# Patient Record
Sex: FEMALE | Race: WHITE | NOT HISPANIC OR LATINO | Employment: UNEMPLOYED | URBAN - METROPOLITAN AREA
[De-identification: names, ages, dates, MRNs, and addresses within clinical notes are randomized per-mention and may not be internally consistent; named-entity substitution may affect disease eponyms.]

---

## 2019-12-21 ENCOUNTER — HOSPITAL ENCOUNTER (EMERGENCY)
Facility: HOSPITAL | Age: 49
Discharge: PSYCHIATRIC OR SUBSTANCE USE FACILITY | End: 2019-12-22
Attending: EMERGENCY MEDICINE | Admitting: EMERGENCY MEDICINE

## 2019-12-21 DIAGNOSIS — F10.920 ALCOHOLIC INTOXICATION WITHOUT COMPLICATION (HCC): Primary | ICD-10-CM

## 2019-12-21 DIAGNOSIS — T50.902A PURPOSEFUL NON-SUICIDAL DRUG INGESTION, INITIAL ENCOUNTER (HCC): ICD-10-CM

## 2019-12-21 LAB
ALBUMIN SERPL-MCNC: 4.4 G/DL (ref 3.5–5.2)
ALBUMIN/GLOB SERPL: 1.5 G/DL
ALP SERPL-CCNC: 108 U/L (ref 39–117)
ALT SERPL W P-5'-P-CCNC: 17 U/L (ref 1–33)
AMPHET+METHAMPHET UR QL: NEGATIVE
ANION GAP SERPL CALCULATED.3IONS-SCNC: 19.8 MMOL/L (ref 5–15)
APAP SERPL-MCNC: <5 MCG/ML (ref 10–30)
AST SERPL-CCNC: 19 U/L (ref 1–32)
BARBITURATES UR QL SCN: NEGATIVE
BASOPHILS # BLD AUTO: 0.06 10*3/MM3 (ref 0–0.2)
BASOPHILS NFR BLD AUTO: 0.7 % (ref 0–1.5)
BENZODIAZ UR QL SCN: NEGATIVE
BILIRUB SERPL-MCNC: <0.2 MG/DL (ref 0.2–1.2)
BUN BLD-MCNC: 12 MG/DL (ref 6–20)
BUN/CREAT SERPL: 18.8 (ref 7–25)
CALCIUM SPEC-SCNC: 8.7 MG/DL (ref 8.6–10.5)
CANNABINOIDS SERPL QL: NEGATIVE
CHLORIDE SERPL-SCNC: 107 MMOL/L (ref 98–107)
CO2 SERPL-SCNC: 20.2 MMOL/L (ref 22–29)
COCAINE UR QL: NEGATIVE
CREAT BLD-MCNC: 0.64 MG/DL (ref 0.57–1)
DEPRECATED RDW RBC AUTO: 43.8 FL (ref 37–54)
EOSINOPHIL # BLD AUTO: 0.16 10*3/MM3 (ref 0–0.4)
EOSINOPHIL NFR BLD AUTO: 1.8 % (ref 0.3–6.2)
ERYTHROCYTE [DISTWIDTH] IN BLOOD BY AUTOMATED COUNT: 13 % (ref 12.3–15.4)
ETHANOL BLD-MCNC: 297 MG/DL (ref 0–10)
ETHANOL BLD-MCNC: 57 MG/DL (ref 0–10)
ETHANOL UR QL: 0.06 %
ETHANOL UR QL: 0.3 %
GFR SERPL CREATININE-BSD FRML MDRD: 119 ML/MIN/1.73
GFR SERPL CREATININE-BSD FRML MDRD: 98 ML/MIN/1.73
GLOBULIN UR ELPH-MCNC: 3 GM/DL
GLUCOSE BLD-MCNC: 111 MG/DL (ref 65–99)
HCT VFR BLD AUTO: 42.5 % (ref 34–46.6)
HGB BLD-MCNC: 14.3 G/DL (ref 12–15.9)
HOLD SPECIMEN: NORMAL
HOLD SPECIMEN: NORMAL
IMM GRANULOCYTES # BLD AUTO: 0.03 10*3/MM3 (ref 0–0.05)
IMM GRANULOCYTES NFR BLD AUTO: 0.3 % (ref 0–0.5)
LYMPHOCYTES # BLD AUTO: 4.76 10*3/MM3 (ref 0.7–3.1)
LYMPHOCYTES NFR BLD AUTO: 54.2 % (ref 19.6–45.3)
MAGNESIUM SERPL-MCNC: 2 MG/DL (ref 1.6–2.6)
MCH RBC QN AUTO: 31 PG (ref 26.6–33)
MCHC RBC AUTO-ENTMCNC: 33.6 G/DL (ref 31.5–35.7)
MCV RBC AUTO: 92 FL (ref 79–97)
METHADONE UR QL SCN: NEGATIVE
MONOCYTES # BLD AUTO: 0.51 10*3/MM3 (ref 0.1–0.9)
MONOCYTES NFR BLD AUTO: 5.8 % (ref 5–12)
NEUTROPHILS # BLD AUTO: 3.27 10*3/MM3 (ref 1.7–7)
NEUTROPHILS NFR BLD AUTO: 37.2 % (ref 42.7–76)
NRBC BLD AUTO-RTO: 0 /100 WBC (ref 0–0.2)
OPIATES UR QL: NEGATIVE
OXYCODONE UR QL SCN: NEGATIVE
PLATELET # BLD AUTO: 204 10*3/MM3 (ref 140–450)
PMV BLD AUTO: 9.4 FL (ref 6–12)
POTASSIUM BLD-SCNC: 3.3 MMOL/L (ref 3.5–5.2)
PROT SERPL-MCNC: 7.4 G/DL (ref 6–8.5)
RBC # BLD AUTO: 4.62 10*6/MM3 (ref 3.77–5.28)
SALICYLATES SERPL-MCNC: <0.3 MG/DL
SODIUM BLD-SCNC: 147 MMOL/L (ref 136–145)
WBC NRBC COR # BLD: 8.79 10*3/MM3 (ref 3.4–10.8)
WHOLE BLOOD HOLD SPECIMEN: NORMAL

## 2019-12-21 PROCEDURE — 80307 DRUG TEST PRSMV CHEM ANLYZR: CPT | Performed by: EMERGENCY MEDICINE

## 2019-12-21 PROCEDURE — 80053 COMPREHEN METABOLIC PANEL: CPT | Performed by: EMERGENCY MEDICINE

## 2019-12-21 PROCEDURE — 85025 COMPLETE CBC W/AUTO DIFF WBC: CPT | Performed by: EMERGENCY MEDICINE

## 2019-12-21 PROCEDURE — 99285 EMERGENCY DEPT VISIT HI MDM: CPT

## 2019-12-21 PROCEDURE — 83735 ASSAY OF MAGNESIUM: CPT | Performed by: EMERGENCY MEDICINE

## 2019-12-21 PROCEDURE — 96365 THER/PROPH/DIAG IV INF INIT: CPT

## 2019-12-21 PROCEDURE — 25010000002 MAGNESIUM SULFATE PER 500 MG OF MAGNESIUM: Performed by: EMERGENCY MEDICINE

## 2019-12-21 PROCEDURE — 25010000002 THIAMINE PER 100 MG: Performed by: EMERGENCY MEDICINE

## 2019-12-21 RX ORDER — POTASSIUM CHLORIDE 750 MG/1
40 CAPSULE, EXTENDED RELEASE ORAL ONCE
Status: COMPLETED | OUTPATIENT
Start: 2019-12-21 | End: 2019-12-21

## 2019-12-21 RX ORDER — POTASSIUM CHLORIDE 7.45 MG/ML
10 INJECTION INTRAVENOUS ONCE
Status: DISCONTINUED | OUTPATIENT
Start: 2019-12-21 | End: 2019-12-21

## 2019-12-21 RX ORDER — ESCITALOPRAM OXALATE 20 MG/1
20 TABLET ORAL DAILY
COMMUNITY
Start: 2019-09-09 | End: 2020-09-08

## 2019-12-21 RX ORDER — BUPROPION HYDROCHLORIDE 100 MG/1
300 TABLET ORAL 2 TIMES DAILY
COMMUNITY
End: 2019-12-24 | Stop reason: HOSPADM

## 2019-12-21 RX ORDER — SODIUM CHLORIDE 0.9 % (FLUSH) 0.9 %
10 SYRINGE (ML) INJECTION AS NEEDED
Status: DISCONTINUED | OUTPATIENT
Start: 2019-12-21 | End: 2019-12-22 | Stop reason: HOSPADM

## 2019-12-21 RX ORDER — MIRTAZAPINE 15 MG/1
15 TABLET, FILM COATED ORAL NIGHTLY
COMMUNITY

## 2019-12-21 RX ORDER — BUSPIRONE HYDROCHLORIDE 15 MG/1
15 TABLET ORAL 3 TIMES DAILY
COMMUNITY

## 2019-12-21 RX ADMIN — THIAMINE HYDROCHLORIDE 1000 ML/HR: 100 INJECTION, SOLUTION INTRAMUSCULAR; INTRAVENOUS at 15:35

## 2019-12-21 RX ADMIN — POTASSIUM CHLORIDE 40 MEQ: 750 CAPSULE, EXTENDED RELEASE ORAL at 18:05

## 2019-12-22 ENCOUNTER — HOSPITAL ENCOUNTER (INPATIENT)
Facility: HOSPITAL | Age: 49
LOS: 2 days | Discharge: HOME | End: 2019-12-24
Attending: SPECIALIST | Admitting: SPECIALIST

## 2019-12-22 VITALS
HEIGHT: 66 IN | HEART RATE: 71 BPM | DIASTOLIC BLOOD PRESSURE: 83 MMHG | BODY MASS INDEX: 26.52 KG/M2 | WEIGHT: 165 LBS | SYSTOLIC BLOOD PRESSURE: 130 MMHG | OXYGEN SATURATION: 96 % | RESPIRATION RATE: 18 BRPM | TEMPERATURE: 97.7 F

## 2019-12-22 PROBLEM — F32.9 MAJOR DEPRESSIVE DISORDER WITH SINGLE EPISODE: Status: ACTIVE | Noted: 2019-12-22

## 2019-12-22 PROBLEM — IMO0001 ALCOHOLISM /ALCOHOL ABUSE: Status: ACTIVE | Noted: 2019-12-22

## 2019-12-22 PROBLEM — B00.2 PRIMARY HERPES SIMPLEX INFECTION OF ORAL REGION: Status: ACTIVE | Noted: 2019-12-22

## 2019-12-22 PROBLEM — Z72.0 TOBACCO ABUSE: Status: ACTIVE | Noted: 2019-12-22

## 2019-12-22 LAB
ALBUMIN SERPL-MCNC: 4.1 G/DL (ref 3.5–5.2)
ALBUMIN/GLOB SERPL: 1.5 G/DL
ALP SERPL-CCNC: 106 U/L (ref 39–117)
ALT SERPL W P-5'-P-CCNC: 14 U/L (ref 1–33)
ANION GAP SERPL CALCULATED.3IONS-SCNC: 14.1 MMOL/L (ref 5–15)
AST SERPL-CCNC: 17 U/L (ref 1–32)
BACTERIA UR QL AUTO: ABNORMAL /HPF
BASOPHILS # BLD AUTO: 0.03 10*3/MM3 (ref 0–0.2)
BASOPHILS NFR BLD AUTO: 0.5 % (ref 0–1.5)
BILIRUB SERPL-MCNC: 0.3 MG/DL (ref 0.2–1.2)
BILIRUB UR QL STRIP: NEGATIVE
BUN BLD-MCNC: 11 MG/DL (ref 6–20)
BUN/CREAT SERPL: 16.4 (ref 7–25)
CALCIUM SPEC-SCNC: 9 MG/DL (ref 8.6–10.5)
CHLORIDE SERPL-SCNC: 106 MMOL/L (ref 98–107)
CLARITY UR: ABNORMAL
CO2 SERPL-SCNC: 24.9 MMOL/L (ref 22–29)
COLOR UR: YELLOW
CREAT BLD-MCNC: 0.67 MG/DL (ref 0.57–1)
DEPRECATED RDW RBC AUTO: 42.4 FL (ref 37–54)
EOSINOPHIL # BLD AUTO: 0.11 10*3/MM3 (ref 0–0.4)
EOSINOPHIL NFR BLD AUTO: 1.7 % (ref 0.3–6.2)
ERYTHROCYTE [DISTWIDTH] IN BLOOD BY AUTOMATED COUNT: 13 % (ref 12.3–15.4)
GFR SERPL CREATININE-BSD FRML MDRD: 93 ML/MIN/1.73
GLOBULIN UR ELPH-MCNC: 2.8 GM/DL
GLUCOSE BLD-MCNC: 100 MG/DL (ref 65–99)
GLUCOSE UR STRIP-MCNC: NEGATIVE MG/DL
HCT VFR BLD AUTO: 39.4 % (ref 34–46.6)
HGB BLD-MCNC: 13.3 G/DL (ref 12–15.9)
HGB UR QL STRIP.AUTO: NEGATIVE
HYALINE CASTS UR QL AUTO: ABNORMAL /LPF
IMM GRANULOCYTES # BLD AUTO: 0.02 10*3/MM3 (ref 0–0.05)
IMM GRANULOCYTES NFR BLD AUTO: 0.3 % (ref 0–0.5)
KETONES UR QL STRIP: NEGATIVE
LEUKOCYTE ESTERASE UR QL STRIP.AUTO: ABNORMAL
LYMPHOCYTES # BLD AUTO: 2.29 10*3/MM3 (ref 0.7–3.1)
LYMPHOCYTES NFR BLD AUTO: 34.9 % (ref 19.6–45.3)
MCH RBC QN AUTO: 30.7 PG (ref 26.6–33)
MCHC RBC AUTO-ENTMCNC: 33.8 G/DL (ref 31.5–35.7)
MCV RBC AUTO: 91 FL (ref 79–97)
MONOCYTES # BLD AUTO: 0.5 10*3/MM3 (ref 0.1–0.9)
MONOCYTES NFR BLD AUTO: 7.6 % (ref 5–12)
NEUTROPHILS # BLD AUTO: 3.62 10*3/MM3 (ref 1.7–7)
NEUTROPHILS NFR BLD AUTO: 55 % (ref 42.7–76)
NITRITE UR QL STRIP: POSITIVE
NRBC BLD AUTO-RTO: 0 /100 WBC (ref 0–0.2)
PH UR STRIP.AUTO: 5.5 [PH] (ref 5–8)
PLATELET # BLD AUTO: 199 10*3/MM3 (ref 140–450)
PMV BLD AUTO: 9.3 FL (ref 6–12)
POTASSIUM BLD-SCNC: 4.6 MMOL/L (ref 3.5–5.2)
PROT SERPL-MCNC: 6.9 G/DL (ref 6–8.5)
PROT UR QL STRIP: NEGATIVE
RBC # BLD AUTO: 4.33 10*6/MM3 (ref 3.77–5.28)
RBC # UR: ABNORMAL /HPF
REF LAB TEST METHOD: ABNORMAL
SODIUM BLD-SCNC: 145 MMOL/L (ref 136–145)
SP GR UR STRIP: 1.02 (ref 1–1.03)
SQUAMOUS #/AREA URNS HPF: ABNORMAL /HPF
T4 FREE SERPL-MCNC: 0.93 NG/DL (ref 0.93–1.7)
TSH SERPL DL<=0.05 MIU/L-ACNC: 0.6 UIU/ML (ref 0.27–4.2)
UROBILINOGEN UR QL STRIP: ABNORMAL
WBC NRBC COR # BLD: 6.57 10*3/MM3 (ref 3.4–10.8)
WBC UR QL AUTO: ABNORMAL /HPF

## 2019-12-22 PROCEDURE — 84439 ASSAY OF FREE THYROXINE: CPT | Performed by: SPECIALIST

## 2019-12-22 PROCEDURE — 84443 ASSAY THYROID STIM HORMONE: CPT | Performed by: SPECIALIST

## 2019-12-22 PROCEDURE — 85025 COMPLETE CBC W/AUTO DIFF WBC: CPT | Performed by: SPECIALIST

## 2019-12-22 PROCEDURE — 81001 URINALYSIS AUTO W/SCOPE: CPT | Performed by: SPECIALIST

## 2019-12-22 PROCEDURE — 90791 PSYCH DIAGNOSTIC EVALUATION: CPT

## 2019-12-22 PROCEDURE — 80053 COMPREHEN METABOLIC PANEL: CPT | Performed by: SPECIALIST

## 2019-12-22 RX ORDER — ACETAMINOPHEN 325 MG/1
650 TABLET ORAL EVERY 4 HOURS PRN
Status: DISCONTINUED | OUTPATIENT
Start: 2019-12-22 | End: 2019-12-24 | Stop reason: HOSPADM

## 2019-12-22 RX ORDER — BUPROPION HYDROCHLORIDE 300 MG/1
300 TABLET ORAL DAILY
Status: DISCONTINUED | OUTPATIENT
Start: 2019-12-22 | End: 2019-12-24 | Stop reason: HOSPADM

## 2019-12-22 RX ORDER — LOPERAMIDE HYDROCHLORIDE 2 MG/1
2 CAPSULE ORAL
Status: DISCONTINUED | OUTPATIENT
Start: 2019-12-22 | End: 2019-12-24 | Stop reason: HOSPADM

## 2019-12-22 RX ORDER — ESCITALOPRAM OXALATE 20 MG/1
20 TABLET ORAL DAILY
Status: DISCONTINUED | OUTPATIENT
Start: 2019-12-22 | End: 2019-12-24 | Stop reason: HOSPADM

## 2019-12-22 RX ORDER — DOCOSANOL 100 MG/G
CREAM TOPICAL
Status: DISCONTINUED | OUTPATIENT
Start: 2019-12-22 | End: 2019-12-24 | Stop reason: HOSPADM

## 2019-12-22 RX ORDER — BUSPIRONE HYDROCHLORIDE 10 MG/1
10 TABLET ORAL EVERY 8 HOURS SCHEDULED
Status: DISCONTINUED | OUTPATIENT
Start: 2019-12-22 | End: 2019-12-22

## 2019-12-22 RX ORDER — ALUMINA, MAGNESIA, AND SIMETHICONE 2400; 2400; 240 MG/30ML; MG/30ML; MG/30ML
15 SUSPENSION ORAL EVERY 6 HOURS PRN
Status: DISCONTINUED | OUTPATIENT
Start: 2019-12-22 | End: 2019-12-24 | Stop reason: HOSPADM

## 2019-12-22 RX ORDER — CHOLECALCIFEROL (VITAMIN D3) 125 MCG
5 CAPSULE ORAL NIGHTLY PRN
Status: DISCONTINUED | OUTPATIENT
Start: 2019-12-22 | End: 2019-12-24 | Stop reason: HOSPADM

## 2019-12-22 RX ORDER — BUSPIRONE HYDROCHLORIDE 10 MG/1
10 TABLET ORAL EVERY 8 HOURS
Status: DISCONTINUED | OUTPATIENT
Start: 2019-12-23 | End: 2019-12-24 | Stop reason: HOSPADM

## 2019-12-22 RX ORDER — GUANFACINE 1 MG/1
1 TABLET, EXTENDED RELEASE ORAL DAILY
Status: DISCONTINUED | OUTPATIENT
Start: 2019-12-22 | End: 2019-12-24 | Stop reason: HOSPADM

## 2019-12-22 RX ORDER — MIRTAZAPINE 15 MG/1
15 TABLET, FILM COATED ORAL NIGHTLY
Status: DISCONTINUED | OUTPATIENT
Start: 2019-12-22 | End: 2019-12-24 | Stop reason: HOSPADM

## 2019-12-22 RX ADMIN — GUANFACINE 1 MG: 1 TABLET, EXTENDED RELEASE ORAL at 14:50

## 2019-12-22 RX ADMIN — ACETAMINOPHEN 650 MG: 325 TABLET, FILM COATED ORAL at 09:11

## 2019-12-22 RX ADMIN — BUPROPION HYDROCHLORIDE 300 MG: 300 TABLET, EXTENDED RELEASE ORAL at 09:14

## 2019-12-22 RX ADMIN — BUSPIRONE HYDROCHLORIDE 10 MG: 10 TABLET ORAL at 09:14

## 2019-12-22 RX ADMIN — BUSPIRONE HYDROCHLORIDE 10 MG: 10 TABLET ORAL at 14:12

## 2019-12-22 RX ADMIN — DOCOSANOL: 100 CREAM TOPICAL at 20:09

## 2019-12-22 RX ADMIN — MIRTAZAPINE 15 MG: 15 TABLET, FILM COATED ORAL at 20:03

## 2019-12-22 RX ADMIN — BUSPIRONE HYDROCHLORIDE 10 MG: 10 TABLET ORAL at 20:08

## 2019-12-22 RX ADMIN — ESCITALOPRAM 20 MG: 20 TABLET, FILM COATED ORAL at 09:13

## 2019-12-22 RX ADMIN — Medication 5 MG: at 20:06

## 2019-12-23 RX ADMIN — Medication 5 MG: at 21:10

## 2019-12-23 RX ADMIN — DOCOSANOL 1 APPLICATION: 100 CREAM TOPICAL at 18:20

## 2019-12-23 RX ADMIN — BUPROPION HYDROCHLORIDE 300 MG: 300 TABLET, EXTENDED RELEASE ORAL at 08:41

## 2019-12-23 RX ADMIN — MIRTAZAPINE 15 MG: 15 TABLET, FILM COATED ORAL at 21:10

## 2019-12-23 RX ADMIN — DOCOSANOL: 100 CREAM TOPICAL at 21:14

## 2019-12-23 RX ADMIN — ESCITALOPRAM 20 MG: 20 TABLET, FILM COATED ORAL at 08:41

## 2019-12-23 RX ADMIN — DOCOSANOL: 100 CREAM TOPICAL at 12:14

## 2019-12-23 RX ADMIN — DOCOSANOL 1 APPLICATION: 100 CREAM TOPICAL at 08:44

## 2019-12-23 RX ADMIN — ACETAMINOPHEN 650 MG: 325 TABLET, FILM COATED ORAL at 12:12

## 2019-12-23 RX ADMIN — GUANFACINE 1 MG: 1 TABLET, EXTENDED RELEASE ORAL at 08:42

## 2019-12-23 RX ADMIN — BUSPIRONE HYDROCHLORIDE 10 MG: 10 TABLET ORAL at 23:03

## 2019-12-23 RX ADMIN — BUSPIRONE HYDROCHLORIDE 10 MG: 10 TABLET ORAL at 15:39

## 2019-12-23 RX ADMIN — DOCOSANOL: 100 CREAM TOPICAL at 15:39

## 2019-12-23 RX ADMIN — BUSPIRONE HYDROCHLORIDE 10 MG: 10 TABLET ORAL at 08:41

## 2019-12-24 VITALS
SYSTOLIC BLOOD PRESSURE: 115 MMHG | RESPIRATION RATE: 18 BRPM | BODY MASS INDEX: 26.44 KG/M2 | DIASTOLIC BLOOD PRESSURE: 74 MMHG | TEMPERATURE: 97.1 F | OXYGEN SATURATION: 98 % | WEIGHT: 163.8 LBS | HEART RATE: 80 BPM

## 2019-12-24 RX ORDER — BUPROPION HYDROCHLORIDE 300 MG/1
300 TABLET ORAL DAILY
Qty: 30 TABLET | Refills: 0 | Status: SHIPPED | OUTPATIENT
Start: 2019-12-25

## 2019-12-24 RX ADMIN — BUPROPION HYDROCHLORIDE 300 MG: 300 TABLET, EXTENDED RELEASE ORAL at 09:21

## 2019-12-24 RX ADMIN — DOCOSANOL: 100 CREAM TOPICAL at 07:41

## 2019-12-24 RX ADMIN — BUSPIRONE HYDROCHLORIDE 10 MG: 10 TABLET ORAL at 07:41

## 2019-12-24 RX ADMIN — DOCOSANOL: 100 CREAM TOPICAL at 11:24

## 2019-12-24 RX ADMIN — ACETAMINOPHEN 650 MG: 325 TABLET, FILM COATED ORAL at 09:22

## 2019-12-24 RX ADMIN — ESCITALOPRAM 20 MG: 20 TABLET, FILM COATED ORAL at 09:21

## 2019-12-24 RX ADMIN — GUANFACINE 1 MG: 1 TABLET, EXTENDED RELEASE ORAL at 09:22

## 2019-12-26 ENCOUNTER — TELEPHONE (OUTPATIENT)
Dept: PSYCHIATRY | Facility: HOSPITAL | Age: 49
End: 2019-12-26

## 2019-12-26 ENCOUNTER — OFFICE VISIT (OUTPATIENT)
Dept: PSYCHIATRY | Facility: HOSPITAL | Age: 49
End: 2019-12-26

## 2019-12-26 DIAGNOSIS — F33.1 MODERATE EPISODE OF RECURRENT MAJOR DEPRESSIVE DISORDER (HCC): Primary | ICD-10-CM

## 2019-12-26 DIAGNOSIS — F10.20 UNCOMPLICATED ALCOHOL DEPENDENCE (HCC): ICD-10-CM

## 2019-12-26 NOTE — PROGRESS NOTES
"[facility] IOP Group note  Observations:    Engaged in Activity / Process and Self -disclosed: Yes  Applies Topic to self: Yes  Able to give Constructive Feedback: Yes  Affect: sad and anxious  Degree of Insightful Thinking 5  Notes:  [id number]  Assisted pt with formulation of and processing 20 negative consequences of her MIKE. Pt has \"no\" SI.    Desirae Clayton, OPALW            "

## 2019-12-26 NOTE — PROGRESS NOTES
Teaching Record:  Information / activity:  Process Addictions  Depression and MIKE as neurochemical/biological diseases of the brain    Good participation   [id number]      Desirae Clayton LCSW

## 2019-12-26 NOTE — TELEPHONE ENCOUNTER
"Patient reports she is doing \"fine.\"  She states she has all her medications except her Remeron, which she plans to ask her psychiatrist for.  She is currently attending MIKE IOP at [facility] and inquired about sober living resources, which this clinician provided a toll free number for.  No further assistance requested at this time.  "

## 2019-12-26 NOTE — PROGRESS NOTES
"[facility] IOP Group note  Observations:    Engaged in Activity / Process and Self -disclosed: Yes  Applies Topic to self: Yes  Able to give Constructive Feedback: Yes  Affect: anxious  Degree of Insightful Thinking 5  Notes:  [id number]  Assisted pt with formulation and processing her chronological history of MIKE. Pt has \"no\" OPAL [place]            "

## 2019-12-26 NOTE — TREATMENT PLAN
Multi-Disciplinary Problems (from Behavioral Health Treatment Plan)    Active Problems     Problem: Substance Abuse Issues  Start Date: 12/26/19    Problem Details:  The patient self-scales this problem as a 8 with 10 being the worst.      Goal Priority Start Date Expected End Date End Date    Patient will abstain from mood altering substances. -- 12/26/19 -- --    Goal Details:  Progress toward goal:  Not appropriate to rate progress toward goal since this is the initial treatment plan.      Goal Intervention Frequency Start Date End Date    Provide education to increase patients understanding of addiction and relapse processes. Weekly 12/26/19 --    Intervention Details:  Duration of treatment until until discharged.      Goal Priority Start Date Expected End Date End Date    Patient will develop insight into how to improve their relationships. -- 12/26/19 -- --    Goal Details:  Progress toward goal:  Not appropriate to rate progress toward goal since this is the initial treatment plan.      Goal Intervention Frequency Start Date End Date    Educate about 12 step recovery programs. Weekly 12/26/19 --    Intervention Details:  Duration of treatment until until discharged.      Goal Priority Start Date Expected End Date End Date    Patient will improve positive self regard. -- 12/26/19 -- --    Goal Details:  Progress toward goal:  Not appropriate to rate progress toward goal since this is the initial treatment plan.      Goal Intervention Frequency Start Date End Date    Educate patient about how substance use affects their relationships. Weekly 12/26/19 --    Intervention Details:  Duration of treatment until until discharged.                         I have discussed and reviewed this treatment plan with the patient.  It has been printed for signatures.

## 2019-12-26 NOTE — PROGRESS NOTES
The patient begins the intensive outpatient chemical dependence treatment program today.  She reports that family was somewhat upset with her discharge from the hospital, but states that she has been able to maintain sobriety.  She denies any suicidal thinking when seen today.

## 2019-12-30 ENCOUNTER — OFFICE VISIT (OUTPATIENT)
Dept: PSYCHIATRY | Facility: HOSPITAL | Age: 49
End: 2019-12-30

## 2019-12-30 DIAGNOSIS — F33.1 MODERATE EPISODE OF RECURRENT MAJOR DEPRESSIVE DISORDER (HCC): Primary | ICD-10-CM

## 2019-12-30 DIAGNOSIS — IMO0001 ALCOHOLISM /ALCOHOL ABUSE: ICD-10-CM

## 2019-12-30 NOTE — PROGRESS NOTES
Teaching Record:  Information / activity:  Overview of the 12 Steps     [id number]  Good participation      Desirae Clayton, OPALW

## 2020-01-02 ENCOUNTER — TELEPHONE (OUTPATIENT)
Dept: PSYCHIATRY | Facility: HOSPITAL | Age: 50
End: 2020-01-02

## 2020-01-07 ENCOUNTER — TELEPHONE (OUTPATIENT)
Dept: PSYCHIATRY | Facility: HOSPITAL | Age: 50
End: 2020-01-07

## 2020-01-08 ENCOUNTER — APPOINTMENT (OUTPATIENT)
Dept: PSYCHIATRY | Facility: HOSPITAL | Age: 50
End: 2020-01-08

## 2020-01-09 ENCOUNTER — APPOINTMENT (OUTPATIENT)
Dept: PSYCHIATRY | Facility: HOSPITAL | Age: 50
End: 2020-01-09

## 2020-01-13 ENCOUNTER — APPOINTMENT (OUTPATIENT)
Dept: PSYCHIATRY | Facility: HOSPITAL | Age: 50
End: 2020-01-13

## 2020-01-14 ENCOUNTER — APPOINTMENT (OUTPATIENT)
Dept: PSYCHIATRY | Facility: HOSPITAL | Age: 50
End: 2020-01-14

## 2020-01-15 ENCOUNTER — APPOINTMENT (OUTPATIENT)
Dept: PSYCHIATRY | Facility: HOSPITAL | Age: 50
End: 2020-01-15

## 2020-01-16 ENCOUNTER — APPOINTMENT (OUTPATIENT)
Dept: PSYCHIATRY | Facility: HOSPITAL | Age: 50
End: 2020-01-16

## 2020-01-20 ENCOUNTER — APPOINTMENT (OUTPATIENT)
Dept: PSYCHIATRY | Facility: HOSPITAL | Age: 50
End: 2020-01-20

## 2020-01-21 ENCOUNTER — APPOINTMENT (OUTPATIENT)
Dept: PSYCHIATRY | Facility: HOSPITAL | Age: 50
End: 2020-01-21

## 2020-01-22 ENCOUNTER — APPOINTMENT (OUTPATIENT)
Dept: PSYCHIATRY | Facility: HOSPITAL | Age: 50
End: 2020-01-22

## 2020-01-23 ENCOUNTER — APPOINTMENT (OUTPATIENT)
Dept: PSYCHIATRY | Facility: HOSPITAL | Age: 50
End: 2020-01-23

## 2020-01-27 ENCOUNTER — APPOINTMENT (OUTPATIENT)
Dept: PSYCHIATRY | Facility: HOSPITAL | Age: 50
End: 2020-01-27

## 2020-01-28 ENCOUNTER — APPOINTMENT (OUTPATIENT)
Dept: PSYCHIATRY | Facility: HOSPITAL | Age: 50
End: 2020-01-28

## 2020-01-29 ENCOUNTER — APPOINTMENT (OUTPATIENT)
Dept: PSYCHIATRY | Facility: HOSPITAL | Age: 50
End: 2020-01-29

## 2020-08-25 ENCOUNTER — LAB REQUISITION (OUTPATIENT)
Dept: LAB | Facility: OTHER | Age: 50
End: 2020-08-25

## 2020-08-25 DIAGNOSIS — Z11.1 SCREENING-PULMONARY TB: ICD-10-CM

## 2020-08-25 PROCEDURE — 86481 TB AG RESPONSE T-CELL SUSP: CPT | Performed by: PHYSICAL MEDICINE & REHABILITATION

## 2020-08-27 LAB
TSPOT INTERPRETATION: NEGATIVE
TSPOT NIL CONTROL INTERPRETATION: NORMAL
TSPOT PANEL A: 0
TSPOT PANEL B: 0
TSPOT POS CONTROL INTERPRETATION: NORMAL